# Patient Record
Sex: FEMALE | Race: WHITE | NOT HISPANIC OR LATINO | Employment: OTHER | URBAN - METROPOLITAN AREA
[De-identification: names, ages, dates, MRNs, and addresses within clinical notes are randomized per-mention and may not be internally consistent; named-entity substitution may affect disease eponyms.]

---

## 2022-05-02 ENCOUNTER — OFFICE VISIT (OUTPATIENT)
Dept: NEUROSURGERY | Facility: CLINIC | Age: 82
End: 2022-05-02

## 2022-05-02 VITALS
HEIGHT: 66 IN | DIASTOLIC BLOOD PRESSURE: 68 MMHG | TEMPERATURE: 98.1 F | SYSTOLIC BLOOD PRESSURE: 116 MMHG | WEIGHT: 153 LBS | BODY MASS INDEX: 24.59 KG/M2

## 2022-05-02 DIAGNOSIS — M21.371 BILATERAL FOOT-DROP: Primary | ICD-10-CM

## 2022-05-02 DIAGNOSIS — M21.372 BILATERAL FOOT-DROP: Primary | ICD-10-CM

## 2022-05-02 PROCEDURE — 99204 OFFICE O/P NEW MOD 45 MIN: CPT | Performed by: PHYSICIAN ASSISTANT

## 2022-05-02 RX ORDER — HYDROCODONE BITARTRATE AND ACETAMINOPHEN 5; 325 MG/1; MG/1
TABLET ORAL
COMMUNITY
End: 2022-05-02

## 2022-05-02 RX ORDER — TRAMADOL HYDROCHLORIDE 50 MG/1
TABLET ORAL
COMMUNITY
End: 2022-05-02

## 2022-05-02 RX ORDER — ACETAMINOPHEN 500 MG
650 TABLET ORAL EVERY 12 HOURS PRN
COMMUNITY

## 2022-05-02 RX ORDER — FUROSEMIDE 20 MG/1
TABLET ORAL
COMMUNITY
End: 2022-05-02

## 2022-05-02 RX ORDER — PSEUDOEPHEDRINE HCL 30 MG
TABLET ORAL
COMMUNITY
End: 2022-05-02

## 2022-05-02 RX ORDER — CYCLOSPORINE 0.5 MG/ML
EMULSION OPHTHALMIC
COMMUNITY

## 2022-05-02 RX ORDER — GABAPENTIN 300 MG/1
CAPSULE ORAL
COMMUNITY

## 2022-05-02 RX ORDER — LANOLIN ALCOHOL/MO/W.PET/CERES
1000 CREAM (GRAM) TOPICAL DAILY
COMMUNITY

## 2022-05-02 RX ORDER — METHOCARBAMOL 750 MG/1
TABLET, FILM COATED ORAL
COMMUNITY
End: 2022-05-02

## 2022-05-02 RX ORDER — BESIFLOXACIN 6 MG/ML
SUSPENSION OPHTHALMIC
COMMUNITY
End: 2022-05-02

## 2022-05-02 RX ORDER — ROPINIROLE 0.5 MG/1
TABLET, FILM COATED ORAL
COMMUNITY
End: 2022-05-02

## 2022-05-02 RX ORDER — TIZANIDINE 4 MG/1
TABLET ORAL
COMMUNITY
End: 2022-05-02

## 2022-05-02 RX ORDER — ASPIRIN 325 MG
TABLET ORAL
COMMUNITY

## 2022-05-02 RX ORDER — FAMOTIDINE 20 MG/1
TABLET, FILM COATED ORAL
COMMUNITY

## 2022-05-02 RX ORDER — LIFITEGRAST 50 MG/ML
SOLUTION/ DROPS OPHTHALMIC
COMMUNITY
End: 2022-05-02

## 2022-05-02 RX ORDER — MELOXICAM 15 MG/1
TABLET ORAL
COMMUNITY

## 2022-05-02 RX ORDER — PRAVASTATIN SODIUM 40 MG
TABLET ORAL
COMMUNITY

## 2022-05-02 NOTE — PROGRESS NOTES
Patient: Carol Felton  : 1940  Gender: female    Primary Care Provider: Shimon Petit MD    Requesting Provider:  Sh[place] [address]     Chief Complaint: Low back pain, bilateral foot drop    History of Present Illness:  This is a 81-year-old woman who presents today for evaluation of low back pain.  Patient has a longstanding history of back pain as well as arthritis.  She follows with Dr. Kwok and underwent the MILD procedure at L4-5 last year.  Her neighbor had surgery with Dr. Cervantes recently therefore she requested evaluation with our clinic.    Patient notes a longstanding history of back pain with bilateral foot drop.  She states that a number of years ago she was diagnosed with peripheral neuropathy.  At present her pain is in her bilateral lumbar region.  Occasionally she will have some pain of her posterior right buttock and hip.  She denies radiation of pain to her lower extremities.  She does note odd sensations distal to the knee bilaterally.  This does not appear consistent with numbness or paresthesias. She feels that her dorsiflexion weakness may be worse on the left. She has some difficulty ambulating given her foot weakness, however denies significant standing or walking intolerance.  She denies bowel or bladder dysfunction.  She does note some difficulty with her balance.  She denies hand numbness.  She underwent a left knee replacement last year which actually improved a lot of her back pain.  She has been through physical therapy and recently epidural injections.  She takes Mobic and gabapentin.  She presents today with a lumbar MRI.    Past Medical and Surgical History:  Past Medical History:   Diagnosis Date   • Arthritis 15 + yrs ago    Osteo arthritis   • Cervical disc disorder    • Infectious viral hepatitis     Hepatitis A  -     • Low back pain     Lumbar epidural 2021;   Had MILD  back surgery  May 2021   • Peripheral  neuropathy 15+ yrs ago    Has worsened noticeably in the last 10 yrs.  Diagnosed w/ foot drop.     Past Surgical History:   Procedure Laterality Date   • BACK SURGERY  May 2021    MILD PERCUTANEOUS       Current Medications:    Current Outpatient Medications:   •  acetaminophen (TYLENOL) 500 MG tablet, acetaminophen 500 mg tablet, Disp: , Rfl:   •  aspirin 325 MG tablet, aspirin 325 mg tablet  Take 1 tablet every day by oral route., Disp: , Rfl:   •  cycloSPORINE (RESTASIS) 0.05 % ophthalmic emulsion, Restasis MultiDose 0.05 % eye drops, Disp: , Rfl:   •  famotidine (PEPCID) 20 MG tablet, famotidine 20 mg tablet, Disp: , Rfl:   •  gabapentin (NEURONTIN) 300 MG capsule, gabapentin 300 mg capsule, Disp: , Rfl:   •  meloxicam (MOBIC) 15 MG tablet, meloxicam 15 mg tablet, Disp: , Rfl:   •  pravastatin (PRAVACHOL) 40 MG tablet, pravastatin 40 mg tablet, Disp: , Rfl:   •  vitamin B-12 (CYANOCOBALAMIN) 1000 MCG tablet, Take 1,000 mcg by mouth Daily., Disp: , Rfl:   •  vitamin D3 125 MCG (5000 UT) capsule capsule, Take 5,000 Units by mouth Daily., Disp: , Rfl:     Allergies:  Allergies   Allergen Reactions   • Erythromycin Base Hives   • Neomycin Hives   • Erythromycin Hives         Review of Systems   Respiratory: Positive for apnea.    Musculoskeletal: Positive for arthralgias, back pain, gait problem, joint swelling, myalgias, neck pain, neck stiffness and bursitis.   Neurological: Positive for weakness and numbness.         Physical Exam  Constitutional:       Appearance: Normal appearance.   Musculoskeletal:         General: Normal range of motion.      Cervical back: Normal range of motion and neck supple.   Skin:     General: Skin is warm and dry.   Neurological:      Mental Status: She is alert and oriented to person, place, and time.      Deep Tendon Reflexes:      Reflex Scores:       Bicep reflexes are 3+ on the right side and 3+ on the left side.       Brachioradialis reflexes are 3+ on the right side and 3+ on  "the left side.       Patellar reflexes are 1+ on the right side and 0 on the left side.       Achilles reflexes are 1+ on the right side and 1+ on the left side.     Comments: Left dorsiflexion 3/5, plantarflexion 5/5, knee extension hip flexion 5/5  Right dorsiflexion 3/5, plantar flexion 5/5, knee extension hip flexion 5/5  Josh sign positive on the right  No clonus elicited at the ankle  Positive Romberg sign  Vibratory sensation is absent in the lower extremities   Psychiatric:         Mood and Affect: Mood normal.         Behavior: Behavior normal.           Vitals:    05/02/22 1120   BP: 116/68   BP Location: Left arm   Patient Position: Sitting   Cuff Size: Adult   Temp: 98.1 °F (36.7 °C)   TempSrc: Infrared   Weight: 69.4 kg (153 lb)   Height: 167.6 cm (66\")       BMI is within normal parameters. No follow-up required.    Independent Review of Diagnostic Imaging:  MRI lumbar spine demonstrates grade 1 anterolisthesis L3-4 with associated severe canal stenosis there is moderate-severe canal stenosis at L4-5.  There is significant facet arthropathy throughout with degenerative scoliotic changes.    Assessment:  1.  Lumbar stenosis without neurogenic claudication  2.  Bilateral foot drop    Plan:  This is a 81-year-old woman who presents today for evaluation of chronic low back pain and longstanding bilateral foot drop.  Lumbar MRI significant for multilevel canal and neuroforaminal narrowing.  Patient has no radicular leg pain at this time and additionally her back pain does not appear consistent with neurogenic claudication.  She does have 3/5 strength of bilateral dorsiflexion.  I will try to track down her previous EMGs to get a better look at this.  We will repeat this if necessary.  Ultimately given her weakness and exam findings I am going to order cervical and thoracic MRIs to rule out cord compression.  I will see her back with the studies.        Lindsey Pham PA-C  "

## 2022-05-26 ENCOUNTER — OFFICE VISIT (OUTPATIENT)
Dept: NEUROSURGERY | Facility: CLINIC | Age: 82
End: 2022-05-26

## 2022-05-26 VITALS
TEMPERATURE: 97 F | HEIGHT: 66 IN | DIASTOLIC BLOOD PRESSURE: 80 MMHG | WEIGHT: 151.8 LBS | BODY MASS INDEX: 24.4 KG/M2 | SYSTOLIC BLOOD PRESSURE: 124 MMHG

## 2022-05-26 DIAGNOSIS — M48.061 SPINAL STENOSIS, LUMBAR REGION, WITHOUT NEUROGENIC CLAUDICATION: Primary | ICD-10-CM

## 2022-05-26 PROCEDURE — 99213 OFFICE O/P EST LOW 20 MIN: CPT | Performed by: PHYSICIAN ASSISTANT

## 2022-05-26 NOTE — PROGRESS NOTES
Patient: Carol Felton  : 1940  Gender: female    Primary Care Provider: Shimon Petit MD    Requesting Provider:  No referring provider defined for this encounter.     Chief Complaint: Low back pain, bilateral foot drop    History of Present Illness:  This is a 81-year-old woman who presents today for follow-up of low back pain.  Patient has a longstanding history of back pain as well as arthritis.  She follows with Dr. Kwok and underwent the MILD procedure at L4-5 last year. Patient notes a longstanding history of back pain with bilateral foot drop.  She states that a number of years ago she was diagnosed with peripheral neuropathy.  At present her pain is in her bilateral lumbar region.  Occasionally she will have some pain of her posterior right buttock and hip.  She denies radiation of pain to her lower extremities.  She does note odd sensations distal to the knee bilaterally.  This does not appear consistent with numbness or paresthesias. She feels that her dorsiflexion weakness may be worse on the left. She has some difficulty ambulating given her foot weakness, however denies significant standing or walking intolerance.  She denies bowel or bladder dysfunction.  She does note some difficulty with her balance.  She denies hand numbness.  She underwent a left knee replacement last year which actually improved a lot of her back pain.  She has been through physical therapy and recently epidural injections.  She takes Mobic and gabapentin.  She also finds relief with Robaxin however her insurance is not covering this now.  She recently received bilateral AFOs which are working well for her.  Her lumbar MRI demonstrates grade 1 anterolisthesis of L3-4 with high-grade canal stenosis at L3-4 and L4-5.  Given her bilateral foot drop and exam findings she is following up today with cervical and thoracic MRIs.      Past Medical and Surgical History:  Past Medical History:   Diagnosis Date   • Arthritis 15 +  yrs ago    Osteo arthritis   • Cervical disc disorder    • Infectious viral hepatitis 1973    Hepatitis A  -  1973   • Low back pain     Lumbar epidural April 2021;   Had MILD  back surgery  May 2021   • Peripheral neuropathy 15+ yrs ago    Has worsened noticeably in the last 10 yrs.  Diagnosed w/ foot drop.     Past Surgical History:   Procedure Laterality Date   • BACK SURGERY  May 2021    MILD PERCUTANEOUS       Current Medications:    Current Outpatient Medications:   •  acetaminophen (TYLENOL) 500 MG tablet, Take 650 mg by mouth Every 12 (Twelve) Hours As Needed., Disp: , Rfl:   •  aspirin 325 MG tablet, aspirin 325 mg tablet  Take 1 tablet every day by oral route., Disp: , Rfl:   •  calcium citrate-vitamin D (CITRACAL+D) 315-200 MG-UNIT per tablet, Daily., Disp: , Rfl:   •  cycloSPORINE (RESTASIS) 0.05 % ophthalmic emulsion, Restasis MultiDose 0.05 % eye drops, Disp: , Rfl:   •  famotidine (PEPCID) 20 MG tablet, famotidine 20 mg tablet, Disp: , Rfl:   •  gabapentin (NEURONTIN) 300 MG capsule, gabapentin 300 mg capsule, Disp: , Rfl:   •  meloxicam (MOBIC) 15 MG tablet, meloxicam 15 mg tablet, Disp: , Rfl:   •  pravastatin (PRAVACHOL) 40 MG tablet, pravastatin 40 mg tablet, Disp: , Rfl:   •  vitamin B-12 (CYANOCOBALAMIN) 1000 MCG tablet, Take 1,000 mcg by mouth Daily., Disp: , Rfl:   •  vitamin D3 125 MCG (5000 UT) capsule capsule, Take 5,000 Units by mouth Daily., Disp: , Rfl:     Allergies:  Allergies   Allergen Reactions   • Erythromycin Base Hives   • Neomycin Hives   • Erythromycin Hives   • Nickel Rash         Review of Systems   Respiratory: Positive for apnea.    Musculoskeletal: Positive for arthralgias, back pain, gait problem, joint swelling, myalgias, neck pain, neck stiffness and bursitis.   Neurological: Positive for weakness and numbness.   All other systems reviewed and are negative.        Physical Exam  Constitutional:       Appearance: Normal appearance.   HENT:      Head: Normocephalic and  "atraumatic.   Musculoskeletal:         General: Normal range of motion.      Cervical back: Normal range of motion and neck supple.   Skin:     General: Skin is warm and dry.   Neurological:      Mental Status: She is alert and oriented to person, place, and time.      Sensory: Sensation is intact.      Motor: Motor function is intact.      Coordination: Coordination is intact.      Gait: Gait is intact.   Psychiatric:         Mood and Affect: Mood normal.         Behavior: Behavior normal.           Vitals:    05/26/22 1157   BP: 124/80   BP Location: Right arm   Patient Position: Sitting   Cuff Size: Adult   Temp: 97 °F (36.1 °C)   TempSrc: Infrared   Weight: 68.9 kg (151 lb 12.8 oz)   Height: 167.6 cm (66\")       BMI is within normal parameters. No other follow-up for BMI required.    Independent Review of Diagnostic Imaging:  MRI of the cervical spine demonstrates multilevel disc degeneration and facet hypertrophy.  There is no high-grade canal stenosis.  There is multilevel foraminal narrowing.  No abnormal cord signal  MRI of the thoracic spine was unremarkable  MRI lumbar spine demonstrates grade 1 anterolisthesis L3-4 with associated severe canal stenosis there is moderate-severe canal stenosis at L4-5.  There is significant facet arthropathy throughout with degenerative scoliotic changes.    Assessment:  1.  Lumbar stenosis with emergent claudication  2.  Bilateral foot drop    Plan:  This is a 81-year-old woman who is seen today in follow-up for low back pain with bilateral foot drop.  I reviewed all of her imaging with Dr. Cervantes.  Cervical and thoracic MRIs demonstrate no evidence of myelopathy.  Her lumbar MRI demonstrates several areas of stenosis, however patient currently has no symptoms of radiculopathy or neurogenic claudication.  In the absence of the symptoms no surgical intervention is recommended.  I discussed symptoms in which patient should return for follow-up with our clinic.  I would be " happy see her back at anytime.        Lindsey Pham PA-C

## 2022-05-27 ENCOUNTER — TELEPHONE (OUTPATIENT)
Dept: NEUROSURGERY | Facility: CLINIC | Age: 82
End: 2022-05-27

## 2022-05-27 PROBLEM — M48.061 SPINAL STENOSIS, LUMBAR REGION, WITHOUT NEUROGENIC CLAUDICATION: Status: ACTIVE | Noted: 2022-05-27
